# Patient Record
Sex: FEMALE | Race: WHITE | NOT HISPANIC OR LATINO
[De-identification: names, ages, dates, MRNs, and addresses within clinical notes are randomized per-mention and may not be internally consistent; named-entity substitution may affect disease eponyms.]

---

## 2017-11-13 ENCOUNTER — RX RENEWAL (OUTPATIENT)
Age: 67
End: 2017-11-13

## 2017-11-13 PROBLEM — Z00.00 ENCOUNTER FOR PREVENTIVE HEALTH EXAMINATION: Status: ACTIVE | Noted: 2017-11-13

## 2017-11-15 ENCOUNTER — RX RENEWAL (OUTPATIENT)
Age: 67
End: 2017-11-15

## 2018-01-14 ENCOUNTER — RX RENEWAL (OUTPATIENT)
Age: 68
End: 2018-01-14

## 2018-01-14 DIAGNOSIS — F39 UNSPECIFIED MOOD [AFFECTIVE] DISORDER: ICD-10-CM

## 2018-06-20 ENCOUNTER — MEDICATION RENEWAL (OUTPATIENT)
Age: 68
End: 2018-06-20

## 2018-06-25 ENCOUNTER — APPOINTMENT (OUTPATIENT)
Dept: FAMILY MEDICINE | Facility: CLINIC | Age: 68
End: 2018-06-25
Payer: MEDICARE

## 2018-06-25 VITALS
WEIGHT: 160 LBS | BODY MASS INDEX: 26.66 KG/M2 | DIASTOLIC BLOOD PRESSURE: 60 MMHG | SYSTOLIC BLOOD PRESSURE: 98 MMHG | HEART RATE: 94 BPM | OXYGEN SATURATION: 97 % | HEIGHT: 65 IN

## 2018-06-25 DIAGNOSIS — C44.90 UNSPECIFIED MALIGNANT NEOPLASM OF SKIN, UNSPECIFIED: ICD-10-CM

## 2018-06-25 DIAGNOSIS — Z52.4 KIDNEY DONOR: ICD-10-CM

## 2018-06-25 DIAGNOSIS — Z13.71 ENCOUNTER FOR NONPROCREATIVE SCREENING FOR GENETIC DISEASE CARRIER STATUS: ICD-10-CM

## 2018-06-25 DIAGNOSIS — Z82.49 FAMILY HISTORY OF ISCHEMIC HEART DISEASE AND OTHER DISEASES OF THE CIRCULATORY SYSTEM: ICD-10-CM

## 2018-06-25 DIAGNOSIS — H80.90 UNSPECIFIED OTOSCLEROSIS, UNSPECIFIED EAR: ICD-10-CM

## 2018-06-25 LAB — CYTOLOGY CVX/VAG DOC THIN PREP: NORMAL

## 2018-06-25 PROCEDURE — 99214 OFFICE O/P EST MOD 30 MIN: CPT

## 2018-06-25 RX ORDER — DICYCLOMINE HYDROCHLORIDE 10 MG/1
10 CAPSULE ORAL
Refills: 0 | Status: ACTIVE | COMMUNITY

## 2018-06-25 RX ORDER — MULTIVIT-MIN/FOLIC/VIT K/LYCOP 400-300MCG
50 MCG TABLET ORAL
Refills: 0 | Status: ACTIVE | COMMUNITY

## 2018-06-25 RX ORDER — BACILLUS COAGULANS/INULIN 1B-250 MG
CAPSULE ORAL
Refills: 0 | Status: ACTIVE | COMMUNITY

## 2018-06-25 NOTE — ASSESSMENT
[FreeTextEntry1] : trial antibiotics  cipro and flagyl for suspect diverticulitis BRAT broth clear liquids for 24 hrs. \par obtain results. follow up colonoscopy.

## 2018-06-25 NOTE — REVIEW OF SYSTEMS
[Fatigue] : fatigue [Recent Change In Weight] : ~T recent weight change [Abdominal Pain] : abdominal pain [Nausea] : nausea [Constipation] : constipation [Diarrhea] : diarrhea [Negative] : Genitourinary [Vomiting] : no vomiting [Heartburn] : no heartburn [Melena] : no melena

## 2018-06-25 NOTE — HEALTH RISK ASSESSMENT
[No falls in past year] : Patient reported no falls in the past year [] : No [de-identified] : no [de-identified] : gastroenterology Dr Carmen Talbert. Dr Abebe gyn onc. Dr Boudreaux oncology

## 2018-06-25 NOTE — PHYSICAL EXAM
[Normal Rhythm/Effort] : normal respiratory rhythm and effort [Clear Bilaterally] : the lungs were clear to auscultation bilaterally [Normal to Percussion] : the lungs were normal to percussion [Normal] : palpation of the chest was normal [Normal Rate] : normal [Normal S1] : normal S1 [Normal S2] : normal S2 [No Murmur] : no murmurs heard [No Pitting Edema] : no pitting edema present [2+] : left 2+ [No Abnormalities] : the abdominal aorta was not enlarged and no bruit was heard [RLQ] : in the right lower quadrant [LLQ] : in the left lower quadrant [Diffuse] : there was tenderness diffusely on palpation [S3] : no S3 [S4] : no S4 [Right Carotid Bruit] : no bruit heard over the right carotid [Left Carotid Bruit] : no bruit heard over the left carotid [Right Femoral Bruit] : no bruit heard over the right femoral artery [Left Femoral Bruit] : no bruit heard over the left femoral artery [Epigastric] : not in the epigastric area [Periumbilical] : not periumbilical [Suprapubic] : not in the suprapubic area [Firm] : not firm [Rigid] : not rigid [Rebound] : no rebound [Guarding] : no guarding [Davidson's] : a negative Davidson's sign [Rovsing's] : a negative Rovsing's sign [Obturator] : a negative obturator sign [Psoas] : a negative psoas sign

## 2018-06-25 NOTE — HISTORY OF PRESENT ILLNESS
[FreeTextEntry8] : Pt is here for Diverticulitis. Pt states she feels cramping,bloating lower abd pain and lower back pain for about 4 days. Pt also states she is going for a colonoscopy on 7/13/2018 i saw Dr Obdulio Talbert. She told me to take benefiber and I could not get off the toilet. I cant eat and I don’t know what to eat. I went to a oncology doctor dr Abebe I have a mass and my Cancer level is elevated.

## 2019-01-29 ENCOUNTER — MEDICATION RENEWAL (OUTPATIENT)
Age: 69
End: 2019-01-29

## 2019-01-29 RX ORDER — METRONIDAZOLE 500 MG/1
500 TABLET ORAL 3 TIMES DAILY
Qty: 30 | Refills: 0 | Status: DISCONTINUED | COMMUNITY
Start: 2018-06-25 | End: 2019-01-29

## 2019-01-29 RX ORDER — CIPROFLOXACIN HYDROCHLORIDE 500 MG/1
500 TABLET, FILM COATED ORAL
Qty: 20 | Refills: 0 | Status: DISCONTINUED | COMMUNITY
Start: 2018-06-25 | End: 2019-01-29

## 2019-03-08 ENCOUNTER — MEDICATION RENEWAL (OUTPATIENT)
Age: 69
End: 2019-03-08

## 2019-05-28 ENCOUNTER — MEDICATION RENEWAL (OUTPATIENT)
Age: 69
End: 2019-05-28

## 2020-01-12 ENCOUNTER — RX RENEWAL (OUTPATIENT)
Age: 70
End: 2020-01-12

## 2020-02-04 ENCOUNTER — RX RENEWAL (OUTPATIENT)
Age: 70
End: 2020-02-04

## 2020-03-27 ENCOUNTER — RX RENEWAL (OUTPATIENT)
Age: 70
End: 2020-03-27

## 2020-05-19 ENCOUNTER — RX RENEWAL (OUTPATIENT)
Age: 70
End: 2020-05-19

## 2020-07-28 ENCOUNTER — RX RENEWAL (OUTPATIENT)
Age: 70
End: 2020-07-28

## 2020-09-15 ENCOUNTER — APPOINTMENT (OUTPATIENT)
Dept: FAMILY MEDICINE | Facility: CLINIC | Age: 70
End: 2020-09-15
Payer: MEDICARE

## 2020-09-15 ENCOUNTER — RX RENEWAL (OUTPATIENT)
Age: 70
End: 2020-09-15

## 2020-09-15 VITALS
HEIGHT: 65 IN | OXYGEN SATURATION: 98 % | SYSTOLIC BLOOD PRESSURE: 130 MMHG | WEIGHT: 160 LBS | TEMPERATURE: 98.2 F | HEART RATE: 93 BPM | BODY MASS INDEX: 26.66 KG/M2 | RESPIRATION RATE: 14 BRPM | DIASTOLIC BLOOD PRESSURE: 78 MMHG

## 2020-09-15 PROCEDURE — 99214 OFFICE O/P EST MOD 30 MIN: CPT

## 2020-09-15 NOTE — PHYSICAL EXAM
[Normal Sclera/Conjunctiva] : normal sclera/conjunctiva [PERRL] : pupils equal round and reactive to light [Normal Outer Ear/Nose] : the outer ears and nose were normal in appearance [Normal Oropharynx] : the oropharynx was normal [Normal TMs] : both tympanic membranes were normal [No Rash] : no rash [Normal Gait] : normal gait [Normal] : affect was normal and insight and judgment were intact

## 2020-09-18 NOTE — HISTORY OF PRESENT ILLNESS
[No Pertinent Cardiac History] : no history of aortic stenosis, atrial fibrillation, coronary artery disease, recent myocardial infarction, or implantable device/pacemaker [No Pertinent Pulmonary History] : no history of asthma, COPD, sleep apnea, or smoking [No Adverse Anesthesia Reaction] : no adverse anesthesia reaction in self or family member [(Patient denies any chest pain, claudication, dyspnea on exertion, orthopnea, palpitations or syncope)] : Patient denies any chest pain, claudication, dyspnea on exertion, orthopnea, palpitations or syncope [Chronic Anticoagulation] : no chronic anticoagulation [Chronic Kidney Disease] : no chronic kidney disease [Diabetes] : no diabetes [FreeTextEntry1] : Drainage of abscess to R clavicle  [FreeTextEntry2] : 09/21/2020 [FreeTextEntry3] : Dr. Dominick Castle [FreeTextEntry4] : patient presents for medical clearance. Patient reports feeling well today, no acute complaints.

## 2020-09-18 NOTE — ASSESSMENT
[No Further Testing Recommended] : no further testing recommended [As per surgery] : as per surgery [Patient Optimized for Surgery] : Patient optimized for surgery [FreeTextEntry4] : Patient is medically cleared fro proposed procedure

## 2020-09-18 NOTE — RESULTS/DATA
[] : results reviewed [de-identified] : WNL [de-identified] : WNL [de-identified] : reviewed, stable for proposed procedure. first degree AV block is new but pt asymptomatic. Will follow up with cardiology post op.

## 2020-12-08 ENCOUNTER — RX RENEWAL (OUTPATIENT)
Age: 70
End: 2020-12-08

## 2020-12-17 ENCOUNTER — RX RENEWAL (OUTPATIENT)
Age: 70
End: 2020-12-17

## 2021-03-09 ENCOUNTER — RX RENEWAL (OUTPATIENT)
Age: 71
End: 2021-03-09

## 2021-04-03 ENCOUNTER — RX RENEWAL (OUTPATIENT)
Age: 71
End: 2021-04-03

## 2021-07-01 ENCOUNTER — RX RENEWAL (OUTPATIENT)
Age: 71
End: 2021-07-01

## 2021-08-09 ENCOUNTER — RX RENEWAL (OUTPATIENT)
Age: 71
End: 2021-08-09

## 2021-12-01 ENCOUNTER — RESULT REVIEW (OUTPATIENT)
Age: 71
End: 2021-12-01

## 2021-12-06 ENCOUNTER — RX RENEWAL (OUTPATIENT)
Age: 71
End: 2021-12-06

## 2021-12-27 ENCOUNTER — RX RENEWAL (OUTPATIENT)
Age: 71
End: 2021-12-27

## 2021-12-29 ENCOUNTER — RX RENEWAL (OUTPATIENT)
Age: 71
End: 2021-12-29

## 2022-01-02 ENCOUNTER — RX RENEWAL (OUTPATIENT)
Age: 72
End: 2022-01-02

## 2022-06-08 ENCOUNTER — RX RENEWAL (OUTPATIENT)
Age: 72
End: 2022-06-08

## 2022-06-22 ENCOUNTER — NON-APPOINTMENT (OUTPATIENT)
Age: 72
End: 2022-06-22

## 2022-06-22 ENCOUNTER — APPOINTMENT (OUTPATIENT)
Dept: FAMILY MEDICINE | Facility: CLINIC | Age: 72
End: 2022-06-22
Payer: MEDICARE

## 2022-06-22 VITALS
OXYGEN SATURATION: 98 % | RESPIRATION RATE: 14 BRPM | SYSTOLIC BLOOD PRESSURE: 138 MMHG | DIASTOLIC BLOOD PRESSURE: 60 MMHG | HEIGHT: 65 IN | BODY MASS INDEX: 26.33 KG/M2 | WEIGHT: 158 LBS | TEMPERATURE: 97.3 F | HEART RATE: 115 BPM

## 2022-06-22 DIAGNOSIS — K57.32 DIVERTICULITIS OF LARGE INTESTINE W/OUT PERFORATION OR ABSCESS W/OUT BLEEDING: ICD-10-CM

## 2022-06-22 DIAGNOSIS — C50.919 MALIGNANT NEOPLASM OF UNSPECIFIED SITE OF UNSPECIFIED FEMALE BREAST: ICD-10-CM

## 2022-06-22 DIAGNOSIS — Z01.818 ENCOUNTER FOR OTHER PREPROCEDURAL EXAMINATION: ICD-10-CM

## 2022-06-22 DIAGNOSIS — Z86.69 PERSONAL HISTORY OF OTHER DISEASES OF THE NERVOUS SYSTEM AND SENSE ORGANS: ICD-10-CM

## 2022-06-22 DIAGNOSIS — I10 ESSENTIAL (PRIMARY) HYPERTENSION: ICD-10-CM

## 2022-06-22 DIAGNOSIS — L02.91 CUTANEOUS ABSCESS, UNSPECIFIED: ICD-10-CM

## 2022-06-22 DIAGNOSIS — E78.5 HYPERLIPIDEMIA, UNSPECIFIED: ICD-10-CM

## 2022-06-22 PROCEDURE — 93000 ELECTROCARDIOGRAM COMPLETE: CPT

## 2022-06-22 PROCEDURE — 99214 OFFICE O/P EST MOD 30 MIN: CPT | Mod: 25

## 2022-06-22 RX ORDER — LORAZEPAM 0.5 MG/1
0.5 TABLET ORAL
Qty: 180 | Refills: 0 | Status: ACTIVE | COMMUNITY
Start: 2022-03-29

## 2022-06-22 RX ORDER — LORAZEPAM 1 MG/1
1 TABLET ORAL
Refills: 0 | Status: COMPLETED | COMMUNITY
End: 2022-06-22

## 2022-06-22 NOTE — PHYSICAL EXAM
[No Acute Distress] : no acute distress [Well Nourished] : well nourished [Well Developed] : well developed [Well-Appearing] : well-appearing [Normal Sclera/Conjunctiva] : normal sclera/conjunctiva [PERRL] : pupils equal round and reactive to light [EOMI] : extraocular movements intact [Normal Outer Ear/Nose] : the outer ears and nose were normal in appearance [Normal Oropharynx] : the oropharynx was normal [No JVD] : no jugular venous distention [No Lymphadenopathy] : no lymphadenopathy [Supple] : supple [Thyroid Normal, No Nodules] : the thyroid was normal and there were no nodules present [No Respiratory Distress] : no respiratory distress  [No Accessory Muscle Use] : no accessory muscle use [Clear to Auscultation] : lungs were clear to auscultation bilaterally [Normal Rate] : normal rate  [Regular Rhythm] : with a regular rhythm [Normal S1, S2] : normal S1 and S2 [No Murmur] : no murmur heard [No Carotid Bruits] : no carotid bruits [No Abdominal Bruit] : a ~M bruit was not heard ~T in the abdomen [No Varicosities] : no varicosities [Pedal Pulses Present] : the pedal pulses are present [No Edema] : there was no peripheral edema [No Palpable Aorta] : no palpable aorta [No Extremity Clubbing/Cyanosis] : no extremity clubbing/cyanosis [Soft] : abdomen soft [Non Tender] : non-tender [Non-distended] : non-distended [No Masses] : no abdominal mass palpated [No HSM] : no HSM [Normal Bowel Sounds] : normal bowel sounds [Normal Posterior Cervical Nodes] : no posterior cervical lymphadenopathy [Normal Anterior Cervical Nodes] : no anterior cervical lymphadenopathy [No CVA Tenderness] : no CVA  tenderness [No Spinal Tenderness] : no spinal tenderness [No Rash] : no rash [Coordination Grossly Intact] : coordination grossly intact [No Focal Deficits] : no focal deficits [Normal Gait] : normal gait [Deep Tendon Reflexes (DTR)] : deep tendon reflexes were 2+ and symmetric [Normal Affect] : the affect was normal [Normal Insight/Judgement] : insight and judgment were intact [de-identified] : hearing aids [de-identified] : left breast mastectomy  [de-identified] : right clavicle scar

## 2022-06-22 NOTE — HISTORY OF PRESENT ILLNESS
[FreeTextEntry1] : Patient presents for med renewal [de-identified] : 72 wf here for follow up  she was seen in 9/20 for surgery \par she sees oncology every 3 months Dr Yee and he checks me out from head to toe \par \par she does not do pap she has been very raw down there ever since she had cancer  chemotherapy and she opts not to do them\par she often gets elevated cea  marker and is usually goes up with her  bouts of diverticulitis\par she had 2-3 bouts of  diverticulitis  she saw gastro dr Sandoval  who did her colonoscopy \par \par she had floater after the cataract surgery w  dr raymond.

## 2022-06-22 NOTE — ASSESSMENT
[FreeTextEntry1] : Basic cardiovascular prevention measures are advised including regular exercise, surveillance medical examination, and prudent portion-controlled low fat diet, rich in a variety of vegetables with minimal added sugars, refined starches, and no artificially hydrogenated oils.\par Discussion and interpretation of previous tests , external notes( labs, radiology, specialist  , patient verbalized understanding.\par Prescription drug management\par renew rosuvastatin sertraline  lisinopril \par immunization utd  consider shingrix. \par EKG performed on this day in the office  to evaluate for any ischemia, new arrhythmia, and for any significant changes and reviewed by THEE Pretty. It is stable.\par \par Labs to be drawn/ specimens obtained  at outside  lab    for evaluation of   assessed conditions - cbc cmp lipid    hgba1c for physical and screening purposes.\par obtain records colonoscopy  , and oncology and ophtalmology \par \par We spent sufficient time to discuss aspects of care; questions were answered  to patient's satisfaction.The diagnosis and care plan were discussed with patient in detail.  Patient test results were  reviewed and explained in full. All questions and concerns  were answered to the best of my knowledge.\par

## 2022-06-22 NOTE — HEALTH RISK ASSESSMENT
[Intercurrent hospitalizations] : was admitted to the hospital  [Yes] : Yes [2 - 4 times a month (2 pts)] : 2-4 times a month (2 points) [1 or 2 (0 pts)] : 1 or 2 (0 points) [No] : In the past 12 months have you used drugs other than those required for medical reasons? No [No falls in past year] : Patient reported no falls in the past year [0] : 2) Feeling down, depressed, or hopeless: Not at all (0) [PHQ-2 Negative - No further assessment needed] : PHQ-2 Negative - No further assessment needed [I have developed a follow-up plan documented below in the note.] : I have developed a follow-up plan documented below in the note. [Designated Healthcare Proxy] : Designated healthcare proxy [Name: ___] : Health Care Proxy's Name: [unfilled]  [Relationship: ___] : Relationship: [unfilled] [Aggressive treatment] : aggressive treatment [de-identified] : colonoscopy dr addi smart, dr raymond [Audit-CScore] : 2 [de-identified] : walk  [de-identified] : no [[id number]] : 0 [AdvancecareDate] : 6/22/22

## 2022-06-23 DIAGNOSIS — H04.123 DRY EYE SYNDROME OF BILATERAL LACRIMAL GLANDS: ICD-10-CM

## 2022-06-30 ENCOUNTER — NON-APPOINTMENT (OUTPATIENT)
Age: 72
End: 2022-06-30

## 2022-12-15 ENCOUNTER — RX RENEWAL (OUTPATIENT)
Age: 72
End: 2022-12-15

## 2022-12-22 ENCOUNTER — LABORATORY RESULT (OUTPATIENT)
Age: 72
End: 2022-12-22

## 2022-12-22 ENCOUNTER — APPOINTMENT (OUTPATIENT)
Dept: FAMILY MEDICINE | Facility: CLINIC | Age: 72
End: 2022-12-22

## 2022-12-22 VITALS
OXYGEN SATURATION: 98 % | HEIGHT: 65 IN | DIASTOLIC BLOOD PRESSURE: 80 MMHG | RESPIRATION RATE: 14 BRPM | HEART RATE: 85 BPM | WEIGHT: 156 LBS | SYSTOLIC BLOOD PRESSURE: 124 MMHG | BODY MASS INDEX: 25.99 KG/M2

## 2022-12-22 VITALS — TEMPERATURE: 97.3 F

## 2022-12-22 DIAGNOSIS — Z80.51 FAMILY HISTORY OF MALIGNANT NEOPLASM OF KIDNEY: ICD-10-CM

## 2022-12-22 DIAGNOSIS — Z80.7 FAMILY HISTORY OF OTHER MALIGNANT NEOPLASMS OF LYMPHOID, HEMATOPOIETIC AND RELATED TISSUES: ICD-10-CM

## 2022-12-22 DIAGNOSIS — Z13.820 ENCOUNTER FOR SCREENING FOR OSTEOPOROSIS: ICD-10-CM

## 2022-12-22 PROCEDURE — G0439: CPT

## 2022-12-22 PROCEDURE — 36415 COLL VENOUS BLD VENIPUNCTURE: CPT

## 2022-12-22 PROCEDURE — 90677 PCV20 VACCINE IM: CPT

## 2022-12-22 PROCEDURE — G0009: CPT

## 2022-12-22 NOTE — HEALTH RISK ASSESSMENT
[1 or 2 (0 pts)] : 1 or 2 (0 points) [Never (0 pts)] : Never (0 points) [No] : In the past 12 months have you used drugs other than those required for medical reasons? No [No falls in past year] : Patient reported no falls in the past year [0] : 2) Feeling down, depressed, or hopeless: Not at all (0) [PHQ-2 Negative - No further assessment needed] : PHQ-2 Negative - No further assessment needed [I have developed a follow-up plan documented below in the note.] : I have developed a follow-up plan documented below in the note. [Patient declined Low Dose CT Scan] : Patient declined Low Dose CT Scan [No Retinopathy] : No retinopathy [Patient reported mammogram was normal] : Patient reported mammogram was normal [Patient declined PAP Smear] : Patient declined PAP Smear [Patient declined bone density test] : Patient declined bone density test [Patient reported colonoscopy was normal] : Patient reported colonoscopy was normal [HIV test declined] : HIV test declined [Hepatitis C test declined] : Hepatitis C test declined [None] : None [With Family] : lives with family [# of Members in Household ___] :  household currently consist of [unfilled] member(s) [Retired] : retired [Significant Other] : lives with significant other [Feels Safe at Home] : Feels safe at home [Fully functional (bathing, dressing, toileting, transferring, walking, feeding)] : Fully functional (bathing, dressing, toileting, transferring, walking, feeding) [Fully functional (using the telephone, shopping, preparing meals, housekeeping, doing laundry, using] : Fully functional and needs no help or supervision to perform IADLs (using the telephone, shopping, preparing meals, housekeeping, doing laundry, using transportation, managing medications and managing finances) [Reports normal functional visual acuity (ie: able to read med bottle)] : Reports normal functional visual acuity [Smoke Detector] : smoke detector [Carbon Monoxide Detector] : carbon monoxide detector [Safety elements used in home] : safety elements used in home [Seat Belt] :  uses seat belt [Sunscreen] : uses sunscreen [With Patient/Caregiver] : , with patient/caregiver [Designated Healthcare Proxy] : Designated healthcare proxy [Name: ___] : Health Care Proxy's Name: [unfilled]  [Relationship: ___] : Relationship: [unfilled] [Aggressive treatment] : aggressive treatment [Very Good] : ~his/her~ current health as very good [Good] : ~his/her~  mood as  good [FreeTextEntry1] : medicare wellness [de-identified] : no [de-identified] : gastro hematology opthalmology  [Audit-CScore] : 0 [de-identified] : walk [de-identified] : no [HCQ1Frivf] : 0 [LowDoseCTScan] : 2022 [EyeExamDate] : 8/12/21 [Change in mental status noted] : No change in mental status noted [Language] : denies difficulty with language [Behavior] : denies difficulty with behavior [Learning/Retaining New Information] : denies difficulty learning/retaining new information [Handling Complex Tasks] : denies difficulty handling complex tasks [Reasoning] : denies difficulty with reasoning [Spatial Ability and Orientation] : denies difficulty with spatial ability and orientation [High School] : high school [# Of Children ___] : has [unfilled] children [Sexually Active] : not sexually active [Reports changes in hearing] : Reports no changes in hearing [Reports changes in vision] : Reports no changes in vision [Reports changes in dental health] : Reports no changes in dental health [Guns at Home] : no guns at home [Travel to Developing Areas] : does not  travel to developing areas [TB Exposure] : is not being exposed to tuberculosis [MammogramDate] : 6/22/22 [PapSmearDate] : 2022 [BoneDensityDate] : 2022 [BoneDensityComments] : ordered [ColonoscopyDate] : 12/1/21 [AdvancecareDate] : 12/22/22

## 2022-12-22 NOTE — ASSESSMENT
[FreeTextEntry1] : 71 yo wf here for medicare wellness\par Basic cardiovascular prevention measures are advised including regular exercise, surveillance medical examination, and prudent portion-controlled low fat diet, rich in a variety of vegetables with minimal added sugars, refined starches, and no artificially hydrogenated oils.\par Discussion and interpretation of previous tests , external notes( labs, radiology, specialist  , patient verbalized understanding.\par Prescription drug management cont meds\par Labs drawn/ specimens obtained  in office on this date of service  for evaluation of   assessed conditions -  physical    to be run at [facility].\par ekg recent\par health maintenance\par \par bone density \par mammogram utd scheduled\par colonoscopy utd\par \par Information regarding prevnar 20      immunization reviewed. All questions answered. Immunization given   .5 cc  intramuscular    right     deltoid. No reaction noted. Advised patients to wash hands to reduce the spread of infection\par \par

## 2022-12-23 LAB
ALBUMIN SERPL ELPH-MCNC: 4.8 G/DL
ALP BLD-CCNC: 90 U/L
ALT SERPL-CCNC: 24 U/L
ANION GAP SERPL CALC-SCNC: 13 MMOL/L
AST SERPL-CCNC: 30 U/L
BASOPHILS # BLD AUTO: 0.1 K/UL
BASOPHILS NFR BLD AUTO: 1 %
BILIRUB SERPL-MCNC: 0.4 MG/DL
BUN SERPL-MCNC: 11 MG/DL
CALCIUM SERPL-MCNC: 10.3 MG/DL
CHLORIDE SERPL-SCNC: 99 MMOL/L
CHOLEST SERPL-MCNC: 210 MG/DL
CO2 SERPL-SCNC: 26 MMOL/L
CREAT SERPL-MCNC: 0.88 MG/DL
EGFR: 71 ML/MIN/1.73M2
EOSINOPHIL # BLD AUTO: 0.42 K/UL
EOSINOPHIL NFR BLD AUTO: 4.4 %
FERRITIN SERPL-MCNC: 578 NG/ML
GLUCOSE SERPL-MCNC: 96 MG/DL
HCT VFR BLD CALC: 46.7 %
HDLC SERPL-MCNC: 87 MG/DL
HGB BLD-MCNC: 15.6 G/DL
IMM GRANULOCYTES NFR BLD AUTO: 0.3 %
LDLC SERPL CALC-MCNC: 101 MG/DL
LYMPHOCYTES # BLD AUTO: 1.69 K/UL
LYMPHOCYTES NFR BLD AUTO: 17.7 %
MAGNESIUM SERPL-MCNC: 1.7 MG/DL
MAN DIFF?: NORMAL
MCHC RBC-ENTMCNC: 32.3 PG
MCHC RBC-ENTMCNC: 33.4 GM/DL
MCV RBC AUTO: 96.7 FL
MONOCYTES # BLD AUTO: 0.76 K/UL
MONOCYTES NFR BLD AUTO: 7.9 %
NEUTROPHILS # BLD AUTO: 6.57 K/UL
NEUTROPHILS NFR BLD AUTO: 68.7 %
NONHDLC SERPL-MCNC: 123 MG/DL
PLATELET # BLD AUTO: 288 K/UL
POTASSIUM SERPL-SCNC: 4.5 MMOL/L
PROT SERPL-MCNC: 8 G/DL
RBC # BLD: 4.83 M/UL
RBC # FLD: 12.2 %
SODIUM SERPL-SCNC: 139 MMOL/L
T3 SERPL-MCNC: 68 NG/DL
T3FREE SERPL-MCNC: 2.48 PG/ML
T4 FREE SERPL-MCNC: 1 NG/DL
TRIGL SERPL-MCNC: 111 MG/DL
TSH SERPL-ACNC: 1.16 UIU/ML
WBC # FLD AUTO: 9.57 K/UL

## 2023-01-16 DIAGNOSIS — Z00.00 ENCOUNTER FOR GENERAL ADULT MEDICAL EXAMINATION W/OUT ABNORMAL FINDINGS: ICD-10-CM

## 2023-01-16 DIAGNOSIS — Z92.29 PERSONAL HISTORY OF OTHER DRUG THERAPY: ICD-10-CM

## 2023-01-16 DIAGNOSIS — H35.379 PUCKERING OF MACULA, UNSPECIFIED EYE: ICD-10-CM

## 2023-01-17 ENCOUNTER — RX RENEWAL (OUTPATIENT)
Age: 73
End: 2023-01-17

## 2023-03-25 ENCOUNTER — RESULT REVIEW (OUTPATIENT)
Age: 73
End: 2023-03-25

## 2023-03-26 ENCOUNTER — RESULT REVIEW (OUTPATIENT)
Age: 73
End: 2023-03-26

## 2023-06-04 ENCOUNTER — RX RENEWAL (OUTPATIENT)
Age: 73
End: 2023-06-04

## 2023-06-08 ENCOUNTER — RX RENEWAL (OUTPATIENT)
Age: 73
End: 2023-06-08

## 2023-06-11 ENCOUNTER — RX CHANGE (OUTPATIENT)
Age: 73
End: 2023-06-11

## 2023-06-12 ENCOUNTER — RX CHANGE (OUTPATIENT)
Age: 73
End: 2023-06-12

## 2023-06-12 RX ORDER — ROSUVASTATIN CALCIUM 20 MG/1
20 TABLET, FILM COATED ORAL
Qty: 90 | Refills: 0 | Status: DISCONTINUED | COMMUNITY
Start: 2018-06-20 | End: 2023-06-12

## 2023-06-13 ENCOUNTER — RX CHANGE (OUTPATIENT)
Age: 73
End: 2023-06-13

## 2023-06-14 ENCOUNTER — RX CHANGE (OUTPATIENT)
Age: 73
End: 2023-06-14

## 2023-06-23 ENCOUNTER — RX CHANGE (OUTPATIENT)
Age: 73
End: 2023-06-23

## 2023-09-02 ENCOUNTER — RX RENEWAL (OUTPATIENT)
Age: 73
End: 2023-09-02

## 2023-09-16 ENCOUNTER — RX RENEWAL (OUTPATIENT)
Age: 73
End: 2023-09-16

## 2023-10-04 ENCOUNTER — APPOINTMENT (OUTPATIENT)
Dept: FAMILY MEDICINE | Facility: CLINIC | Age: 73
End: 2023-10-04
Payer: MEDICARE

## 2023-10-04 VITALS
WEIGHT: 160 LBS | BODY MASS INDEX: 26.66 KG/M2 | HEIGHT: 65 IN | RESPIRATION RATE: 15 BRPM | SYSTOLIC BLOOD PRESSURE: 124 MMHG | TEMPERATURE: 98.3 F | DIASTOLIC BLOOD PRESSURE: 80 MMHG | OXYGEN SATURATION: 98 % | HEART RATE: 85 BPM

## 2023-10-04 PROCEDURE — 99213 OFFICE O/P EST LOW 20 MIN: CPT

## 2023-10-05 LAB
RAPID RVP RESULT: NOT DETECTED
SARS-COV-2 RNA PNL RESP NAA+PROBE: NOT DETECTED

## 2023-11-25 ENCOUNTER — APPOINTMENT (OUTPATIENT)
Dept: FAMILY MEDICINE | Facility: CLINIC | Age: 73
End: 2023-11-25
Payer: MEDICARE

## 2023-11-25 ENCOUNTER — RX RENEWAL (OUTPATIENT)
Age: 73
End: 2023-11-25

## 2023-11-25 VITALS
TEMPERATURE: 98.8 F | SYSTOLIC BLOOD PRESSURE: 142 MMHG | RESPIRATION RATE: 14 BRPM | OXYGEN SATURATION: 98 % | HEART RATE: 90 BPM | DIASTOLIC BLOOD PRESSURE: 90 MMHG

## 2023-11-25 VITALS — DIASTOLIC BLOOD PRESSURE: 70 MMHG | SYSTOLIC BLOOD PRESSURE: 134 MMHG

## 2023-11-25 DIAGNOSIS — J45.909 UNSPECIFIED ASTHMA, UNCOMPLICATED: ICD-10-CM

## 2023-11-25 DIAGNOSIS — R05.1 ACUTE COUGH: ICD-10-CM

## 2023-11-25 PROCEDURE — 99203 OFFICE O/P NEW LOW 30 MIN: CPT

## 2023-11-25 RX ORDER — FLUTICASONE PROPIONATE AND SALMETEROL 100; 50 UG/1; UG/1
100-50 POWDER RESPIRATORY (INHALATION)
Qty: 1 | Refills: 3 | Status: ACTIVE | COMMUNITY
Start: 2023-11-25 | End: 1900-01-01

## 2023-11-25 RX ORDER — METHYLPREDNISOLONE 4 MG/1
4 TABLET ORAL
Qty: 1 | Refills: 0 | Status: DISCONTINUED | COMMUNITY
Start: 2023-10-04 | End: 2023-11-25

## 2023-11-25 RX ORDER — GUAIFENESIN AND CODEINE PHOSPHATE 10; 100 MG/5ML; MG/5ML
100-10 SOLUTION ORAL
Qty: 280 | Refills: 0 | Status: ACTIVE | COMMUNITY
Start: 2023-11-25 | End: 1900-01-01

## 2023-12-02 ENCOUNTER — RX RENEWAL (OUTPATIENT)
Age: 73
End: 2023-12-02

## 2023-12-14 ENCOUNTER — RX RENEWAL (OUTPATIENT)
Age: 73
End: 2023-12-14

## 2024-02-29 ENCOUNTER — RX RENEWAL (OUTPATIENT)
Age: 74
End: 2024-02-29

## 2024-03-05 ENCOUNTER — APPOINTMENT (OUTPATIENT)
Dept: FAMILY MEDICINE | Facility: CLINIC | Age: 74
End: 2024-03-05

## 2024-03-15 ENCOUNTER — RX RENEWAL (OUTPATIENT)
Age: 74
End: 2024-03-15

## 2024-04-16 ENCOUNTER — RX RENEWAL (OUTPATIENT)
Age: 74
End: 2024-04-16

## 2024-04-16 RX ORDER — SERTRALINE HYDROCHLORIDE 100 MG/1
100 TABLET, FILM COATED ORAL
Qty: 90 | Refills: 1 | Status: ACTIVE | COMMUNITY
Start: 2018-01-14 | End: 1900-01-01

## 2024-04-16 RX ORDER — ROSUVASTATIN CALCIUM 20 MG/1
20 TABLET, FILM COATED ORAL
Qty: 90 | Refills: 0 | Status: ACTIVE | COMMUNITY
Start: 2023-06-12 | End: 1900-01-01

## 2024-04-16 RX ORDER — LISINOPRIL 5 MG/1
5 TABLET ORAL
Qty: 90 | Refills: 0 | Status: ACTIVE | COMMUNITY
Start: 2017-11-13 | End: 1900-01-01

## 2024-04-25 ENCOUNTER — APPOINTMENT (OUTPATIENT)
Dept: FAMILY MEDICINE | Facility: CLINIC | Age: 74
End: 2024-04-25

## 2024-10-25 ENCOUNTER — RX RENEWAL (OUTPATIENT)
Age: 74
End: 2024-10-25

## 2025-06-10 ENCOUNTER — RX RENEWAL (OUTPATIENT)
Age: 75
End: 2025-06-10

## 2025-06-17 ENCOUNTER — APPOINTMENT (OUTPATIENT)
Dept: FAMILY MEDICINE | Facility: CLINIC | Age: 75
End: 2025-06-17
Payer: MEDICARE

## 2025-06-17 VITALS
SYSTOLIC BLOOD PRESSURE: 120 MMHG | WEIGHT: 157 LBS | BODY MASS INDEX: 26.16 KG/M2 | OXYGEN SATURATION: 97 % | TEMPERATURE: 97.7 F | HEART RATE: 76 BPM | HEIGHT: 65 IN | RESPIRATION RATE: 14 BRPM | DIASTOLIC BLOOD PRESSURE: 70 MMHG

## 2025-06-17 PROBLEM — R05.3 CHRONIC COUGH: Status: ACTIVE | Noted: 2025-06-17

## 2025-06-17 PROCEDURE — 99214 OFFICE O/P EST MOD 30 MIN: CPT

## 2025-06-17 PROCEDURE — G2211 COMPLEX E/M VISIT ADD ON: CPT

## 2025-07-29 DIAGNOSIS — E04.1 NONTOXIC SINGLE THYROID NODULE: ICD-10-CM
